# Patient Record
(demographics unavailable — no encounter records)

---

## 2024-12-06 NOTE — PHYSICAL EXAM
[Awake] : awake [Alert] : alert [Soft] : soft [Oriented x3] : oriented to person, place, and time [Normal] : cervix [IUD String] : had an IUD string protruding out [Acute Distress] : no acute distress [Mass] : no breast mass [Nipple Discharge] : no nipple discharge [Axillary LAD] : no axillary lymphadenopathy [Tender] : non tender [FreeTextEntry4] : mild old blood

## 2024-12-06 NOTE — END OF VISIT
[TextEntry] : ILazara, am scribing for and the presence of Dr. Mable Iyer the following sections HISTORY OF PRESENT ILLNESS, PAST MEDICAL/FAMILY/SOCIAL HISTORY; REVIEW OF SYSTEMS; PHYSICAL EXAM; DISPOSITION.

## 2024-12-06 NOTE — PLAN
[FreeTextEntry1] : We discussed possibility of changing to OCPs if bleeding with Mirena IUD is bothersome. Pt. states the prolonged bleeding is tolerable at this time and will call if desires a change.